# Patient Record
Sex: FEMALE | Race: WHITE
[De-identification: names, ages, dates, MRNs, and addresses within clinical notes are randomized per-mention and may not be internally consistent; named-entity substitution may affect disease eponyms.]

---

## 2020-01-18 ENCOUNTER — HOSPITAL ENCOUNTER (OUTPATIENT)
Dept: HOSPITAL 76 - DI | Age: 70
Discharge: HOME | End: 2020-01-18
Attending: INTERNAL MEDICINE
Payer: MEDICARE

## 2020-01-18 DIAGNOSIS — M47.816: Primary | ICD-10-CM

## 2020-01-18 DIAGNOSIS — M43.8X6: ICD-10-CM

## 2020-01-18 DIAGNOSIS — M51.36: ICD-10-CM

## 2020-01-18 DIAGNOSIS — M41.9: ICD-10-CM

## 2020-01-18 DIAGNOSIS — M51.34: ICD-10-CM

## 2020-01-18 DIAGNOSIS — M47.817: ICD-10-CM

## 2020-01-18 PROCEDURE — 72070 X-RAY EXAM THORAC SPINE 2VWS: CPT

## 2020-01-18 PROCEDURE — 72100 X-RAY EXAM L-S SPINE 2/3 VWS: CPT

## 2020-01-19 NOTE — XRAY REPORT
Reason:  SCOLIOSIS

Procedure Date:  01/18/2020   

Accession Number:  [id number] / [id number]                    

Procedure:  XR  - Lumbar Spine 2 View CPT Code:  

 

***Final Report***

 

 

FULL RESULT:

 

 

EXAM:

LUMBAR SPINE RADIOGRAPHY

 

EXAM DATE: 1/18/2020 01:11 PM

 

HISTORY: SCOLIOSIS.

 

COMPARISON: XR LUMBAR SPINE 2 OR 3 VIEWS 07/29/2010 1:08 PM

 

TECHNIQUE:  AP and Lateral two view exam

 

FINDINGS:

 

Moderate to severe convex left scoliosis centered at the L1-L2 level.

There is corresponding diffuse degenerative disk disease with disk space 

narrowing most prominent at the concave margin of the upper lumbar spine 

and concave aspect of the lower lumbar spine.

 

Moderate to large osteophyte formation is seen. Endplate sclerotic 

changes are noted.

There has been significant progression of disease since the previous exam.

 

There is probably moderate to severe facet osteoarthritis from L3-S1.

 

No definitive acute fracture but there is some suggestion of a 

compression deformity of L2. If there is concern for acute fracture, CT 

should be done.

 

IMPRESSION:

 

Progressive scoliosis and diffuse degenerative disk disease.

 

Mild compression deformity at L2 is probably old but there is limited 

visualization because of positioning and degenerative changes. Consider 

CT follow-up if clinically warranted.

 

[facility]

## 2020-01-19 NOTE — XRAY REPORT
Reason:  SCOLIOSIS

Procedure Date:  01/18/2020   

Accession Number:  [id number] / [id number]                    

Procedure:  XR  - Thoracic Spine 2 View CPT Code:  

 

***Final Report***

 

 

FULL RESULT:

 

 

EXAM:

THORACIC SPINE RADIOGRAPHY

 

EXAM DATE: 1/18/2020 01:09 PM

 

HISTORY: SCOLIOSIS.

 

TECHNIQUE:  AP and lateral two view exam

 

COMPARISON: CHEST 2 VIEW 01/24/2019 8:38 AM

 

FINDINGS:

 

Mild upper thoracic dextroconvexity noted with the apex centered at T5.

There is some straightening of the thoracic kyphosis through most of the 

thoracic spine with mild kyphosis noted at the thoracolumbar level.

 

Moderate thoracolumbar levo convexity seen with the apex centered at 

L1-L2.

 

No apparent thoracic vertebral body fracture.

 

There is probably mild diffuse disk space narrowing of the mid to lower 

thoracic levels. Assessment is somewhat limited because of curvature.

 

Other: None.

IMPRESSION:

 

-Mild upper to mid thoracic dextroconvexity.

- Moderate thoracolumbar levo convexity.

-Mild mid to lower thoracic degenerative disk disease.

 

 

[facility]

## 2020-06-27 ENCOUNTER — HOSPITAL ENCOUNTER (OUTPATIENT)
Dept: HOSPITAL 76 - DI | Age: 70
Discharge: HOME | End: 2020-06-27
Attending: INTERNAL MEDICINE
Payer: MEDICARE

## 2020-06-27 DIAGNOSIS — M79.672: Primary | ICD-10-CM

## 2020-06-27 NOTE — XRAY REPORT
PROCEDURE:  Foot 2 View LT

 

INDICATIONS:  LT FOOT PAIN

 

TECHNIQUE:  2 views of the foot were acquired.  

 

COMPARISON:  None

 

FINDINGS:  

 

Bones:  No fractures or dislocations.  No suspicious bony lesions.  

 

Soft tissues:  No tibiotalar joint effusion.  Achilles tendon appears normal.  

 

IMPRESSION:  

No evidence acute bony abnormality of the left foot.

 

Reviewed by: Ken Lopez MD on 6/27/2020 5:54 PM SREE

Approved by: Ken Lopez MD on 6/27/2020 5:54 PM SREE

 

 

Station ID:  [id number]

## 2021-04-21 ENCOUNTER — HOSPITAL ENCOUNTER (OUTPATIENT)
Dept: HOSPITAL 76 - DI | Age: 71
Discharge: HOME | End: 2021-04-21
Attending: INTERNAL MEDICINE
Payer: MEDICARE

## 2021-04-21 DIAGNOSIS — M81.0: Primary | ICD-10-CM

## 2021-04-21 DIAGNOSIS — Z12.31: Primary | ICD-10-CM

## 2021-04-22 NOTE — MAMMOGRAPHY REPORT
BILATERAL DIGITAL SCREENING MAMMOGRAM 3D/2D WITH AUGMENTATION: 4/21/2021

 

CLINICAL: Routine screening.  

 

Comparison is made to exams dated:  9/20/2018 mammogram, 5/23/2017 mammogram, and 3/19/2013 mammogram
 - [facility].  There are scattered fibroglandular elements in both breasts.  

 

Bilateral breast implants are present.  

No significant masses, calcifications, or other findings are seen in either breast.  

There has been no significant interval change.

 

IMPRESSION: NEGATIVE

There is no mammographic evidence of malignancy. A 1 year screening mammogram is recommended.  

 

 

This exam was interpreted at Station ID: [id number].  

 

NOTE: For mammograms, a report in lay terms will be sent to the patient. Approximately 15% of breast 
malignancies will not be visualized mammographically. In the management of a palpable breast mass, a 
negative mammogram must not discourage biopsy of a clinically suspicious lesion.

 

Electronically Signed By: Enrique Geller M.D.          

ar/penrad:4/21/2021 14:41:09  

 

 

 

ACR BI-RADS Category 1: Negative 3341F

PARENCHYMAL PATTERN: (A) - The breast(s) demonstrate(s) scattered fibroglandular densities.

BI-RADS CATEGORY: (1) - 1

RECOMMENDATION: (ANNUAL)  - Recommend routine annual screening mammography.

[id number]

1 year screening

LATERALITY: (B)

## 2021-04-27 NOTE — DEXA REPORT
PROCEDURE: Dexa Spine and/or Hip

 

INDICATIONS: OSTEOPOROSIS: FOREARM INCLUDED

 

TECHNIQUE: Dual energy x-ray absorptiometry (DXA) was performed on a [employer] System.  Regions measur
ed are the AP Spine, femoral neck, and if needed forearm.

 

COMPARISON: None.

  

FINDINGS: Left forearm included secondary to lumbar scoliosis and degenerative discogenic changes

Left Femoral Neck:

Bone Mineral Density  0.760 g/cm/cm, T score -2.0,    

 

Left forearm:

Radius bone Mineral Density 0.5-0 g/cm/cm, T score -2.5,    

 

(T score greater or equal to -1.0: NORMAL)

(T score from -1.1 to -2.4: OSTEOPENIA)

(T score less than or equal to -2.5 to: OSTEOPOROSIS)

 

Impression: Osteoporosis

 

Patients with diagnosis of osteoporosis or osteopenia should have regular bone mineral density assess
ment.  For those eligible for Medicare, routine testing is allowed once every 2 years.  Testing frequ
ency can be increased for patients who have rapidly progressing disease or for those who are receivin
g medical therapy to restore bone mass.

 

Reviewed by: Cisco Menchaca MD on 4/21/2021 2:01 PM PDT

Approved by: Cisco Menchaca MD on 4/21/2021 2:01 PM PDT

 

 

Station ID:  [id number]

## 2021-09-23 ENCOUNTER — HOSPITAL ENCOUNTER (OUTPATIENT)
Dept: HOSPITAL 76 - DI.S | Age: 71
Discharge: HOME | End: 2021-09-23
Attending: PHYSICIAN ASSISTANT
Payer: MEDICARE

## 2021-09-23 DIAGNOSIS — S82.832A: Primary | ICD-10-CM

## 2021-09-23 NOTE — XRAY REPORT
PROCEDURE:  Foot 3 View LT

 

INDICATIONS:  FOOT PAIN, LEFT

 

TECHNIQUE:  3 views of the foot were acquired.  

 

COMPARISON:  X-ray ankle 9/23/2021

 

FINDINGS:  

 

Bones:  No fractures or dislocations.  No suspicious bony lesions.  

 

Soft tissues:  No tibiotalar joint effusion.  Achilles tendon appears normal.  

 

IMPRESSION:  

No visualized acute fracture or dislocation. However, occult injury cannot be excluded. Recommend chong
rt interval imaging follow-up in 7-10 days as clinically indicated for additional evaluation.

 

Reviewed by: Yolande Abdalla MD on 9/23/2021 2:02 PM PDT

Approved by: Yolande Abdalla MD on 9/23/2021 2:02 PM PDT

 

 

Station ID:  [id number]

## 2021-09-23 NOTE — XRAY REPORT
PROCEDURE:  Ankle 3 View LT

 

INDICATIONS:  FOOT PAIN, LEFT

 

TECHNIQUE:  3 views of the ankle were acquired.  

 

COMPARISON:  None

 

FINDINGS:  

 

Bones: Nondisplaced fracture to the distal fibular metaphysis noted. Minimal soft tissue in the fract
ure line noted. No soft tissue swelling. Ankle mortise is maintained.

 

Soft tissues:  No tibiotalar joint effusion.  Achilles tendon appears normal.  

 

IMPRESSION:  Nondisplaced distal fibular fracture without soft tissue swelling probably reflects suba
cute fracture. 

 

Reviewed by: Siva Lisa MD on 9/23/2021 1:27 PM SREE

Approved by: Siva Lisa MD on 9/23/2021 1:27 PM SREE

 

 

Station ID:  [id number]

## 2023-08-16 ENCOUNTER — HOSPITAL ENCOUNTER (OUTPATIENT)
Dept: HOSPITAL 76 - DI | Age: 73
Discharge: HOME | End: 2023-08-16
Attending: INTERNAL MEDICINE
Payer: MEDICARE

## 2023-08-16 DIAGNOSIS — N95.9: Primary | ICD-10-CM

## 2023-08-16 DIAGNOSIS — Z12.31: Primary | ICD-10-CM

## 2023-08-16 DIAGNOSIS — R92.8: ICD-10-CM

## 2023-08-16 NOTE — DEXA REPORT
PROCEDURE: Dexa Spine and/or Hip

 

INDICATIONS: MENOPAUSAL

 

TECHNIQUE: Dual energy x-ray absorptiometry (DXA) was performed on a Thumb System.  Regions measur
ed are the AP Spine, femoral neck, and if needed forearm.

 

COMPARISON: 4/21/2021

  

FINDINGS:

 

Lumbar Spine: 

Bone Mineral Density 1.2 g/cm/cm,T score  -0.1. Normal bone density

 

Left Forearm:

Bone Mineral Density 0.5 g/cm/cm, T score 0.7. Normal bone density

 

 

Impression: 

By WHO criteria, this patient has normal bone density of the lumbar spine and left forearm 

 

Patients with diagnosis of osteoporosis or osteopenia should have regular bone mineral density assess
ment.  For those eligible for Medicare, routine testing is allowed once every 2 years.  Testing frequ
ency can be increased for patients who have rapidly progressing disease or for those who are receivin
g medical therapy to restore bone mass.

 

Reviewed by: Luh Ring MD on 8/16/2023 4:50 PM PDT

Approved by: Luh Ring MD on 8/16/2023 4:50 PM PDT

 

 

Station ID:  [id number]

## 2023-08-17 NOTE — MAMMOGRAPHY REPORT
BILATERAL DIGITAL SCREENING MAMMOGRAM 3D/2D WITH AUGMENTATION: 8/16/2023

CLINICAL: Routine screening.  

 

Comparison is made to exams dated:  4/21/2021 mammogram, 9/20/2018 mammogram, 5/23/2017 mammogram, an
d 3/19/2013 mammogram - [facility].  

 

There are scattered areas of fibroglandular density in both breasts (category b / 25%-50% glandular t
issue).  

 

There is an asymmetry in the left breast anterior depth lateral region seen on the craniocaudal view 
only.  

No other significant masses, calcifications, or other findings are seen in either breast.  

 

IMPRESSION: INCOMPLETE: NEEDS ADDITIONAL IMAGING EVALUATION

The asymmetry in the left breast is indeterminate.  Additional views with possible ultrasound are rec
ommended.  

 

 

Based on the Tyrer Cuzick model (a risk assessment model) the patients lifetime risk is 5.7% and her
 10 year risk is 3.9%. According to the ACR, ACS, and NCCN guidelines, an annual breast MRI exam peng
g with mammogram is recommended if the patients lifetime risk is 20% or greater.

 

 

This exam was interpreted at Station ID: [id number].  

 

NOTE: For mammograms, a report in lay terms will be sent to the patient. Approximately 15% of breast 
malignancies will not be visualized mammographically. In the management of a palpable breast mass, a 
negative mammogram must not discourage biopsy of a clinically suspicious lesion.

 

Electronically Signed By: Yovani Mcdaniels M.D.          

lc/:8/16/2023 16:22:08  

 

 

 

ACR BI-RADS Category 0: Incomplete 3340F

PARENCHYMAL PATTERN: (A) - The breast(s) demonstrate(s) scattered fibroglandular densities.

BI-RADS CATEGORY: (0) - 0

Mammo and US

[id number]

Immediate follow-up

LATERALITY: (B)

## 2023-09-08 ENCOUNTER — HOSPITAL ENCOUNTER (OUTPATIENT)
Dept: HOSPITAL 76 - DI | Age: 73
Discharge: HOME | End: 2023-09-08
Attending: INTERNAL MEDICINE
Payer: MEDICARE

## 2023-09-08 DIAGNOSIS — R92.2: Primary | ICD-10-CM

## 2023-09-11 NOTE — MAMMOGRAPHY REPORT
UNILATERAL LEFT DIGITAL DIAGNOSTIC MAMMOGRAM 3D/2D WITH AUGMENTATION: 9/8/2023

CLINICAL: Patient returns today to evaluate an asymmetry in the left breast.  

 

Comparison is made to exams dated:  8/16/2023 mammogram, 4/21/2021 mammogram, and 9/20/2018 mammogram
 - [facility].  

 

There are scattered areas of fibroglandular density in the left breast (category b / 25%-50% glandula
r tissue).  

 

The oval asymmetry in the left breast anterior depth lateral region seen on the craniocaudal view onl
y is no longer seen.  This was only seen on the non-implant displaced view which was repeated today u
sing tomosynthesis and the previously described asymmetry represented a focal area of tortuous vessel
. 

No other significant masses or calcifications are seen in the breast.  

 

IMPRESSION: NEGATIVE

No mammographic evidence for malignancy.

A 1 year screening mammogram is recommended.  

 

Findings and recommendations were conveyed to the patient during today's evaluation.

 

Based on the Tyrer Cuzick model (a risk assessment model) the patients lifetime risk is 5.7% and her
 10 year risk is 3.9%. According to the ACR, ACS, and NCCN guidelines, an annual breast MRI exam peng
g with mammogram is recommended if the patients lifetime risk is 20% or greater.

 

 

This exam was interpreted at Station ID: [id number].  

 

NOTE: For mammograms, a report in lay terms will be sent to the patient. Approximately 15% of breast 
malignancies will not be visualized mammographically. In the management of a palpable breast mass, a 
negative mammogram must not discourage biopsy of a clinically suspicious lesion.

 

Electronically Signed By: Freddy Cheney M.D.          

aty/:9/8/2023 13:36:05      Entry:  - 9/11/2023 10:47:55

 

 

 

ACR BI-RADS Category 1: Negative 3341F

PARENCHYMAL PATTERN: (A) - The breast(s) demonstrate(s) scattered fibroglandular densities.

BI-RADS CATEGORY: (1) - 1

Mammogram

[id number]

1 year screening

LATERALITY: (B)

## 2024-02-27 ENCOUNTER — HOSPITAL ENCOUNTER (OUTPATIENT)
Dept: HOSPITAL 76 - LAB.S | Age: 74
Discharge: HOME | End: 2024-02-27
Attending: EMERGENCY MEDICINE
Payer: MEDICARE

## 2024-02-27 DIAGNOSIS — R30.0: Primary | ICD-10-CM

## 2024-02-27 LAB
CLARITY UR REFRACT.AUTO: CLEAR
GLUCOSE UR QL STRIP.AUTO: NEGATIVE MG/DL
KETONES UR QL STRIP.AUTO: NEGATIVE MG/DL
NITRITE UR QL STRIP.AUTO: NEGATIVE
PH UR STRIP.AUTO: 6.5 PH (ref 5–7.5)
PROT UR STRIP.AUTO-MCNC: NEGATIVE MG/DL
RBC # UR STRIP.AUTO: NEGATIVE /UL
RBC # URNS HPF: (no result) /HPF (ref 0–5)
SP GR UR STRIP.AUTO: <=1.005 (ref 1–1.03)
SQUAMOUS URNS QL MICRO: (no result)
UROBILINOGEN UR QL STRIP.AUTO: (no result) E.U./DL
UROBILINOGEN UR STRIP.AUTO-MCNC: NEGATIVE MG/DL
WBC # UR MANUAL: (no result) /HPF (ref 0–5)

## 2024-02-27 PROCEDURE — 81001 URINALYSIS AUTO W/SCOPE: CPT

## 2024-02-27 PROCEDURE — 87086 URINE CULTURE/COLONY COUNT: CPT

## 2024-04-29 ENCOUNTER — HOSPITAL ENCOUNTER (OUTPATIENT)
Dept: HOSPITAL 76 - LAB.S | Age: 74
Discharge: HOME | End: 2024-04-29
Attending: NURSE PRACTITIONER
Payer: MEDICARE

## 2024-04-29 DIAGNOSIS — N39.0: Primary | ICD-10-CM

## 2024-04-29 PROCEDURE — 87086 URINE CULTURE/COLONY COUNT: CPT
